# Patient Record
Sex: MALE
[De-identification: names, ages, dates, MRNs, and addresses within clinical notes are randomized per-mention and may not be internally consistent; named-entity substitution may affect disease eponyms.]

---

## 2020-08-06 ENCOUNTER — HOSPITAL ENCOUNTER (EMERGENCY)
Dept: HOSPITAL 39 - ER | Age: 40
Discharge: HOME | End: 2020-08-06
Payer: SELF-PAY

## 2020-08-06 VITALS — OXYGEN SATURATION: 95 % | TEMPERATURE: 98.9 F

## 2020-08-06 VITALS — SYSTOLIC BLOOD PRESSURE: 113 MMHG | DIASTOLIC BLOOD PRESSURE: 66 MMHG
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
## 2020-08-06 DIAGNOSIS — S60.451A: Primary | ICD-10-CM

## 2020-08-06 NOTE — ED.PDOC
History of Present Illness





- General


Chief Complaint: General


Stated Complaint: fish hook in finger


Time Seen by Provider: 08/06/20 20:44


Source: patient, RN notes reviewed, Vital Signs reviewed


Exam Limitations: no limitations





- History of Present Illness


Initial Comments: 





40 y/o male fishing got one hook of a treble in L index finger just PTA


Timing/Duration: just prior to arrival


Severity: moderate


Location: hands - L index finger


Improving Factors: nothing


Worsening Factors: nothing


Associated Symptoms: denies symptoms





Review of Systems





- Review of Systems


Constitutional: States: no symptoms reported


Skin: States: other - fish hook in L index finger





Physical Exam





- Physical Exam


General Appearance: Alert, No apparent distress


Neck: full range of motion, normal inspection


Respiratory: no respiratory distress


Skin Problem Location: other - fish hook in finger





Progress





- Progress


Progress: 





08/06/20 21:03


 fish hook removal from L index.  Cleansed before and after removal. removed 

with small incision using scissors. 





Procedures





- Foreign Body Removal


Foreign Body Removal: fish hook


Foreign Body Physician Comment:: Ana





Departure





- Departure


Clinical Impression: 


Fish hook injury of index finger


Qualifiers:


 Encounter type: initial encounter Laterality: left Qualified Code(s): S69.92XA 

- Unspecified injury of left wrist, hand and finger(s), initial encounter





Time of Disposition: 21:10


Disposition: Discharge to Home or Self Care


Condition: Good


Departure Forms:  ED Discharge - Pt. Copy, Patient Portal Self Enrollment


Instructions:  Foreign Body in Skin, Removal of Foreign Body in Skin


Referrals: 


Marcia Sales MD [Primary Care Provider] - 1-2 Weeks